# Patient Record
Sex: MALE | Race: WHITE | Employment: FULL TIME | URBAN - METROPOLITAN AREA
[De-identification: names, ages, dates, MRNs, and addresses within clinical notes are randomized per-mention and may not be internally consistent; named-entity substitution may affect disease eponyms.]

---

## 2019-10-15 ENCOUNTER — APPOINTMENT (OUTPATIENT)
Dept: GENERAL RADIOLOGY | Facility: CLINIC | Age: 44
End: 2019-10-15
Attending: EMERGENCY MEDICINE
Payer: OTHER MISCELLANEOUS

## 2019-10-15 ENCOUNTER — HOSPITAL ENCOUNTER (EMERGENCY)
Facility: CLINIC | Age: 44
Discharge: HOME | End: 2019-10-15
Attending: EMERGENCY MEDICINE | Admitting: EMERGENCY MEDICINE
Payer: OTHER MISCELLANEOUS

## 2019-10-15 VITALS
WEIGHT: 225 LBS | HEART RATE: 73 BPM | DIASTOLIC BLOOD PRESSURE: 99 MMHG | BODY MASS INDEX: 29.82 KG/M2 | SYSTOLIC BLOOD PRESSURE: 126 MMHG | OXYGEN SATURATION: 97 % | HEIGHT: 73 IN | RESPIRATION RATE: 18 BRPM | TEMPERATURE: 97.8 F

## 2019-10-15 DIAGNOSIS — M54.6 ACUTE BILATERAL THORACIC BACK PAIN: ICD-10-CM

## 2019-10-15 PROCEDURE — 96372 THER/PROPH/DIAG INJ SC/IM: CPT

## 2019-10-15 PROCEDURE — 25000128 H RX IP 250 OP 636: Performed by: EMERGENCY MEDICINE

## 2019-10-15 PROCEDURE — 72072 X-RAY EXAM THORAC SPINE 3VWS: CPT

## 2019-10-15 PROCEDURE — 71046 X-RAY EXAM CHEST 2 VIEWS: CPT

## 2019-10-15 PROCEDURE — 25000132 ZZH RX MED GY IP 250 OP 250 PS 637: Performed by: EMERGENCY MEDICINE

## 2019-10-15 PROCEDURE — 99284 EMERGENCY DEPT VISIT MOD MDM: CPT | Mod: 25

## 2019-10-15 RX ORDER — LIDOCAINE 4 G/G
2 PATCH TOPICAL ONCE
Status: DISCONTINUED | OUTPATIENT
Start: 2019-10-15 | End: 2019-10-15 | Stop reason: HOSPADM

## 2019-10-15 RX ORDER — CYCLOBENZAPRINE HCL 10 MG
5-10 TABLET ORAL 3 TIMES DAILY PRN
Qty: 20 TABLET | Refills: 0 | Status: SHIPPED | OUTPATIENT
Start: 2019-10-15 | End: 2019-10-21

## 2019-10-15 RX ORDER — CYCLOBENZAPRINE HCL 10 MG
10 TABLET ORAL ONCE
Status: COMPLETED | OUTPATIENT
Start: 2019-10-15 | End: 2019-10-15

## 2019-10-15 RX ORDER — LIDOCAINE 50 MG/G
1-2 PATCH TOPICAL EVERY 24 HOURS
Qty: 15 PATCH | Refills: 0 | Status: SHIPPED | OUTPATIENT
Start: 2019-10-15

## 2019-10-15 RX ORDER — KETOROLAC TROMETHAMINE 15 MG/ML
15 INJECTION, SOLUTION INTRAMUSCULAR; INTRAVENOUS ONCE
Status: COMPLETED | OUTPATIENT
Start: 2019-10-15 | End: 2019-10-15

## 2019-10-15 RX ORDER — OXYCODONE HYDROCHLORIDE 5 MG/1
5 TABLET ORAL ONCE
Status: COMPLETED | OUTPATIENT
Start: 2019-10-15 | End: 2019-10-15

## 2019-10-15 RX ADMIN — OXYCODONE HYDROCHLORIDE 5 MG: 5 TABLET ORAL at 17:39

## 2019-10-15 RX ADMIN — LIDOCAINE 2 PATCH: 560 PATCH PERCUTANEOUS; TOPICAL; TRANSDERMAL at 17:39

## 2019-10-15 RX ADMIN — CYCLOBENZAPRINE HYDROCHLORIDE 10 MG: 10 TABLET, FILM COATED ORAL at 17:39

## 2019-10-15 RX ADMIN — KETOROLAC TROMETHAMINE 15 MG: 15 INJECTION, SOLUTION INTRAMUSCULAR; INTRAVENOUS at 17:39

## 2019-10-15 ASSESSMENT — MIFFLIN-ST. JEOR: SCORE: 1974.47

## 2019-10-15 NOTE — ED NOTES
Bed: ED30  Expected date: 10/15/19  Expected time:   Means of arrival: Ambulance  Comments:  [id number] 46yo M back pain

## 2019-10-15 NOTE — DISCHARGE INSTRUCTIONS
Follow up with primary care provider if not feeling better  Can keep lidocaine patches on for 12 hours then should take off for 12 hours. Then can put new patches on  Flexeril as needed  Tylenol 500-650 mg every 6 hours for pain with max of 3000 mg per day  [facility] would be next step  Follow up with primary care provider for additional work restrictions or clearance       Discharge Instructions  Back Pain  You were seen today for back pain. Back pain can have many causes, but most will get better without surgery or other specific treatment. Sometimes there is a herniated ( slipped ) disc. We do not usually do MRI scans to look for these right away, since most herniated discs will get better on their own with time.  Today, we did not find any evidence that your back pain was caused by a serious condition. However, sometimes symptoms develop over time and cannot be found during an emergency visit, so it is very important that you follow up with your primary provider.  Generally, every Emergency Department visit should have a follow-up clinic visit with either a primary or a specialty clinic/provider. Please follow-up as instructed by your emergency provider today.    Return to the Emergency Department if:  You develop a fever with your back pain.   You have weakness in one or both legs.  You lose control of your bowels or bladder, or cannot empty your bladder (cannot pee).  Your pain gets much worse.     Follow-up with your provider:  Unless your pain has completely gone away, please make an appointment with your provider within one week. Most of the routine care for back pain is available in a clinic and not the Emergency Department. You may need further management of your back pain, such as more pain medication, imaging such as an X-ray or MRI, or physical therapy.    What can I do to help myself?  Remain Active -- People are often afraid that they will hurt their back further or delay recovery by  remaining active, but this is one of the best things you can do for your back. In fact, staying in bed for a long time to rest is not recommended. Studies have shown that people with low back pain recover faster when they remain active. Movement helps to bring blood flow to the muscles and relieve muscle spasms as well as preventing loss of muscle strength.  Heat -- Using a heating pad can help with low back pain during the first few weeks. Do not sleep with a heating pad, as you can be burned.   Pain medications - You may take a pain medication such as Tylenol  (acetaminophen), Advil , Motrin  (ibuprofen) or Aleve  (naproxen).  If you were given a prescription for medicine here today, be sure to read all of the information (including the package insert) that comes with your prescription.  This will include important information about the medicine, its side effects, and any warnings that you need to know about.  The pharmacist who fills the prescription can provide more information and answer questions you may have about the medicine.  If you have questions or concerns that the pharmacist cannot address, please call or return to the Emergency Department.   Remember that you can always come back to the Emergency Department if you are not able to see your regular provider in the amount of time listed above, if you get any new symptoms, or if there is anything that worries you.

## 2019-10-15 NOTE — ED AVS SNAPSHOT
[facility] Emergency Department  [address]  [facility] [address]  Phone:  [phone]  Fax:  [phone]                                    Lex Brandt   MRN: [id number]    Department:  [facility] Emergency Department   Date of Visit:  10/15/2019           After Visit Summary Signature Page    I have received my discharge instructions, and my questions have been answered. I have discussed any challenges I see with this plan with the nurse or doctor.    ..........................................................................................................................................  Patient/Patient Representative Signature      ..........................................................................................................................................  Patient Representative Print Name and Relationship to Patient    ..................................................               ................................................  Date                                   Time    ..........................................................................................................................................  Reviewed by Signature/Title    ...................................................              ..............................................  Date                                               Time          22EPIC Rev 08/18

## 2019-10-15 NOTE — ED TRIAGE NOTES
"At work, picking up a box that was about 7lbs, felt \"something squishy\" in mid back and sharp pains. Hx of some back problems in the past. Tingling down right arm to ring finger and down L leg. No loss of bowel or bladder. Given tylenol by EMS. Rated 5/10 while still.  "

## 2019-10-16 ASSESSMENT — ENCOUNTER SYMPTOMS
BACK PAIN: 1
FEVER: 0
DIZZINESS: 0
NUMBNESS: 1
WEAKNESS: 0

## 2022-12-27 NOTE — ED PROVIDER NOTES
"  History     Chief Complaint:  Back Pain    HPI   Lex Brandt is a 44 year old male who presents for evaluation of back pain. He reports this pain started immediately after picking up a seven pound box while at work. He describes feeling \"something squishy\" in his mid back associated with sharp pains. Given the severity of pain, EMS was called and they administered Tylenol prior to arrival. Here, he rates his pain at a 5/10 and also reports tingling down his right arm to fingers, as well as down his left leg. Though thinks he is having these symptoms due to the way he was sitting in ambulance. He denies any groin numbness, incontinence of bowel/bladder, fevers, or other symptoms. He also denies any known trauma to the back.     Allergies:  No Known Allergies      Medications:    Glyburide  Metformin     Past Medical History:    Type II diabetes    Past Surgical History:    Appendectomy     Family History:    No past pertinent family history.    Social History:  Marital Status:   [2]  Former smoker  Negative for alcohol use.     Review of Systems   Constitutional: Negative for fever.   Genitourinary:        No incontinence   Musculoskeletal: Positive for back pain.   Neurological: Positive for numbness. Negative for dizziness and weakness.   All other systems reviewed and are negative.    Physical Exam     Patient Vitals for the past 24 hrs:   BP Temp Temp src Pulse Resp SpO2 Height Weight   10/15/19 1730 (!) 126/99 -- -- 73 -- 97 % -- --   10/15/19 1715 115/81 -- -- 74 -- 100 % -- --   10/15/19 1700 117/68 -- -- 76 -- 98 % -- --   10/15/19 1649 127/82 97.8  F (36.6  C) Oral 80 18 98 % 1.854 m (6' 1\") 102.1 kg (225 lb)   10/15/19 1645 127/82 -- -- 79 -- 100 % -- --        Physical Exam  General: Appears uncomfortable lying on his back  Eyes:  The pupils are equal and round    Conjunctivae and sclerae are normal  ENT:    Moist mucous membranes  Neck:  Normal range of motion  CV:  Regular rate and " rhythm    Skin warm and well perfused     DP/PT pulses 2+ bilaterally  Resp:  Lungs are clear    Non-labored    No rales    No wheezing   GI:  Abdomen is soft, there is no rigidity    No distension    No rebound tenderness     No abdominal tenderness  MS:  Tenderness on mid thoracic back that is diffuse across entire back. No midline tenderness  Skin:  No rash or acute skin lesions noted  Neuro:   Awake, alert.      Speech is normal and fluent.    Face is symmetric.     Moves all extremities equally, no arm or leg drift    No arm or leg weakness     Dorsiflexion/plantarflexion/EHL on bilateral LE equal    SILT on bilateral UE/LE  Psych: Normal affect.  Appropriate interactions.    Emergency Department Course     Imaging:  Radiographic findings were communicated with the patient who voiced understanding of the findings.  XR Chest PA & LAT:   Linear atelectasis left base. No consolidation. Heart size normal. Degenerative change thoracic spine, as per radiology.     XR Thoracic spine, 3 views:  There is mild chronic wedging of three mid thoracic vertebrae. This is probably due to old trauma. There is no acute fracture. Normal vertebral heights and alignment. Normal disc spaces for age, as per radiology.      Interventions:  1739 Toradol, 15 mg, IM injection   Lidocaine patch, 2 patches, transdermal   Oxycodone, 5 mg, PO   Flexeril, 10 mg, PO    Emergency Department Course:  Nursing notes and vitals reviewed.    I performed an exam of the patient as documented above.      The patient was sent for chest and spine x-rays while in the emergency department, results above.    Medicine administered as documented above.     1820: I rechecked the patient and discussed the results of his workup thus far.     Findings and plan explained to the Patient. Patient discharged home with instructions regarding supportive care, medications, and reasons to return. The importance of close follow-up was reviewed. The patient was prescribed  Flexeril and lidocaine patches.     I personally reviewed the imaging results with the Patient and answered all related questions prior to discharge.       Impression & Plan      Medical Decision Making:  Lex Brandt is a 44 year old male who presents with back pain. This was clearly started after he bent down and lifted up a 7 pound box. He has pain on palpation of mid-thoracic area. Normal neurologic exam. Doubt fracture. Chest x-ray and thoracic spine x-ray were obtained; he has chronic findings on the thoracic spine which were discussed with the patient, but no evidence of pneumothorax. I do not think MRI is indicated today. He is feeling much better in the ED and is able to ambulate. I recommend follow-up with [facility] and to continue with physical therapy as he is already doing. I discussed pain control for home. I doubt cauda equina, epidural abscess, tumor, CVA, PE, stone, or infection.      Diagnosis:    ICD-10-CM    1. Acute bilateral thoracic back pain M54.6        Disposition:  discharged to home    Discharge Medications:  New Prescriptions    CYCLOBENZAPRINE (FLEXERIL) 10 MG TABLET    Take 0.5-1 tablets (5-10 mg) by mouth 3 times daily as needed for muscle spasms    LIDOCAINE (LIDODERM) 5 % PATCH    Place 1-2 patches onto the skin every 24 hours To prevent lidocaine toxicity, patient should be patch free for 12 hrs daily.       Scribe Disclosure:  I, Komal Jamison, am serving as a scribe on 10/15/2019 at 6:21 PM to personally document services performed by Socorro Rico MD based on my observations and the provider's statements to me.      10/15/2019   [facility] EMERGENCY DEPARTMENT       Socorro Rico MD  10/16/19 0152     Bi-Rhombic Flap Text: The defect edges were debeveled with a #15 scalpel blade.  Given the location of the defect and the proximity to free margins a bi-rhombic flap was deemed most appropriate.  Using a sterile surgical marker, an appropriate rhombic flap was drawn incorporating the defect. The area thus outlined was incised deep to adipose tissue with a #15 scalpel blade.  The skin margins were undermined to an appropriate distance in all directions utilizing iris scissors.